# Patient Record
Sex: MALE | Race: WHITE | NOT HISPANIC OR LATINO
[De-identification: names, ages, dates, MRNs, and addresses within clinical notes are randomized per-mention and may not be internally consistent; named-entity substitution may affect disease eponyms.]

---

## 2019-01-04 PROBLEM — Z00.00 ENCOUNTER FOR PREVENTIVE HEALTH EXAMINATION: Status: ACTIVE | Noted: 2019-01-04

## 2019-01-10 ENCOUNTER — APPOINTMENT (OUTPATIENT)
Dept: HEMATOLOGY ONCOLOGY | Facility: CLINIC | Age: 79
End: 2019-01-10
Payer: MEDICARE

## 2019-01-10 VITALS
OXYGEN SATURATION: 97 % | BODY MASS INDEX: 28.62 KG/M2 | SYSTOLIC BLOOD PRESSURE: 110 MMHG | DIASTOLIC BLOOD PRESSURE: 68 MMHG | TEMPERATURE: 97.6 F | HEART RATE: 90 BPM | HEIGHT: 74 IN | WEIGHT: 223 LBS | RESPIRATION RATE: 18 BRPM

## 2019-01-10 DIAGNOSIS — Z77.090 CONTACT WITH AND (SUSPECTED) EXPOSURE TO ASBESTOS: ICD-10-CM

## 2019-01-10 DIAGNOSIS — Z86.79 PERSONAL HISTORY OF OTHER DISEASES OF THE CIRCULATORY SYSTEM: ICD-10-CM

## 2019-01-10 DIAGNOSIS — Z87.19 PERSONAL HISTORY OF OTHER DISEASES OF THE DIGESTIVE SYSTEM: ICD-10-CM

## 2019-01-10 DIAGNOSIS — Z86.39 PERSONAL HISTORY OF OTHER ENDOCRINE, NUTRITIONAL AND METABOLIC DISEASE: ICD-10-CM

## 2019-01-10 DIAGNOSIS — Z78.9 OTHER SPECIFIED HEALTH STATUS: ICD-10-CM

## 2019-01-10 DIAGNOSIS — Z86.2 PERSONAL HISTORY OF DISEASES OF THE BLOOD AND BLOOD-FORMING ORGANS AND CERTAIN DISORDERS INVOLVING THE IMMUNE MECHANISM: ICD-10-CM

## 2019-01-10 PROCEDURE — 99205 OFFICE O/P NEW HI 60 MIN: CPT

## 2019-01-10 RX ORDER — UBIDECARENONE/VIT E ACET 100MG-5
1000 CAPSULE ORAL
Refills: 0 | Status: ACTIVE | COMMUNITY

## 2019-01-10 RX ORDER — LOVASTATIN 40 MG/1
40 TABLET ORAL DAILY
Refills: 0 | Status: ACTIVE | COMMUNITY

## 2019-01-10 RX ORDER — BICALUTAMIDE 50 MG/1
50 TABLET ORAL
Refills: 0 | Status: ACTIVE | COMMUNITY

## 2019-01-10 RX ORDER — ASPIRIN 81 MG
81 TABLET, DELAYED RELEASE (ENTERIC COATED) ORAL
Refills: 0 | Status: ACTIVE | COMMUNITY

## 2019-01-10 RX ORDER — TAMSULOSIN HYDROCHLORIDE 0.4 MG/1
0.4 CAPSULE ORAL
Refills: 0 | Status: COMPLETED | COMMUNITY
End: 2019-01-01

## 2019-01-12 PROBLEM — Z86.39 HISTORY OF VITAMIN D DEFICIENCY: Status: RESOLVED | Noted: 2019-01-10 | Resolved: 2019-01-12

## 2019-01-12 PROBLEM — Z77.090 LONG TERM EXPOSURE TO ASBESTOS: Status: ACTIVE | Noted: 2019-01-10

## 2019-01-12 PROBLEM — Z86.39 HISTORY OF HYPERLIPIDEMIA: Status: RESOLVED | Noted: 2019-01-10 | Resolved: 2019-01-12

## 2019-01-12 PROBLEM — Z86.2 HISTORY OF ANEMIA: Status: RESOLVED | Noted: 2019-01-10 | Resolved: 2019-01-12

## 2019-01-12 PROBLEM — Z86.79 HISTORY OF HYPERTENSION: Status: RESOLVED | Noted: 2019-01-10 | Resolved: 2019-01-12

## 2019-01-12 PROBLEM — Z87.19 HISTORY OF DIVERTICULOSIS: Status: RESOLVED | Noted: 2019-01-10 | Resolved: 2019-01-12

## 2019-01-12 PROBLEM — Z86.79 HISTORY OF AORTIC VALVE DISORDER: Status: RESOLVED | Noted: 2019-01-10 | Resolved: 2019-01-12

## 2019-01-12 PROBLEM — Z78.9 CAFFEINE USE: Status: ACTIVE | Noted: 2019-01-10

## 2019-01-12 NOTE — HISTORY OF PRESENT ILLNESS
[de-identified] : This is a very pleasant 78-year-old gentleman with the below past medical history whose oncological history dates back to December of 2015. He had been receiving his care in [facility] up to this point.   He underwent a prostatic biopsy for a rising PSA, he believes it was 4.1ng/ml at that time, and was found to have a [place] score 9 adenocarcinoma. He underwent a CAT scan of the abdomen and pelvis on January 12, 2016 as well as a bone scan on that same day both of which were negative for metastatic disease. He also underwent a DEXA bone density scan which revealed osteopenia. He was therefore initiated on androgen deprivation therapy in late January of 2016 and underwent IMRT.  It was recommended to him to stay on Lupron for 2-3 years thereafter. More recently it was noted that his PSA started increasing again. As an example, his PSA on 4/5/2017 was 0.24; on 8/8/2017 and was 0.34; on 4/26/2018 was 1.1; on 7/24/2018 it was 2.4 ng/ml. He believes that approximately in August of 2018 bicalutamide was added to his treatment regimen.  His course was complicated with urinary retention after the radiation therapy. He has recently moved to the local area to be closer to family and is wishing to transfer his care here.  He has been under the care of Dr. Waters since admission at [facility] for urinary tract infection in September of 2018.  His most recent PSA obtained by Dr. Waters was more than 28 ng/ml.  He is now referred for further treatment and evaluation. [de-identified] : Mr Clarke is here today for an initial consultation in the company of his daughter, Hattie. He has been referred by Dr. Waters, urology for evaluation.

## 2019-01-12 NOTE — ASSESSMENT
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 He has undergone androgen deprivation therapy and IMRT and who appears to have a rapidly rising PSA despite the addition of bicalutamide in August of 2018. He is also had a bone scan on 12/26/18 that shows suspicious uptake in the manubrium, right anterior second rib, and at the superior lateral aspect of the left acetabulum.  Given these findings I would obtain complete staging scans to include a CAT scan of the chest, abdomen, and pelvis. This may further assess the bone scan findings, evaluate the parenchyma, and further evaluate the collecting system given the abnormalities seen on x-rays in September of 2018.  I will also obtain testosterone levels to see if he is adequately suppressed.\par Further recommendations will depend on the above findings. If he does have bony metastatic disease than his Prolia every 6 months should be converted to Xgeva every 4 weeks.\par I will obtain a repeat CBC with differential and a peripheral blood smear review as he appears to have become somewhat severely anemic on his most recent blood work. I will also obtain a complete hematological workup for this at today's office visit. I have requested more detailed records from his prior treating oncologist in [place]. He will return in 2 weeks for followup of the above and further treatment recommendations.\par \par Thank you very much for allowing me to participate in this gentleman's medical care, should you have any questions or concerns please do not hesitate to call me directly.

## 2019-01-12 NOTE — REASON FOR VISIT
[Initial Consultation] : an initial consultation [Spouse] : spouse [FreeTextEntry2] : prostate cancer.

## 2019-01-12 NOTE — REVIEW OF SYSTEMS
[Fatigue] : fatigue [SOB on Exertion] : shortness of breath during exertion [Abdominal Pain] : abdominal pain [Negative] : Psychiatric [FreeTextEntry7] : occasional [FreeTextEntry8] : Currently uses indwelling catheter 24 hours a day.

## 2019-01-24 ENCOUNTER — APPOINTMENT (OUTPATIENT)
Dept: HEMATOLOGY ONCOLOGY | Facility: CLINIC | Age: 79
End: 2019-01-24
Payer: MEDICARE

## 2019-01-28 ENCOUNTER — APPOINTMENT (OUTPATIENT)
Dept: HEMATOLOGY ONCOLOGY | Facility: CLINIC | Age: 79
End: 2019-01-28
Payer: MEDICARE

## 2019-01-28 VITALS
OXYGEN SATURATION: 99 % | HEIGHT: 74 IN | BODY MASS INDEX: 27.72 KG/M2 | WEIGHT: 216 LBS | TEMPERATURE: 97.4 F | RESPIRATION RATE: 20 BRPM | DIASTOLIC BLOOD PRESSURE: 69 MMHG | SYSTOLIC BLOOD PRESSURE: 113 MMHG | HEART RATE: 90 BPM

## 2019-01-28 PROCEDURE — 99214 OFFICE O/P EST MOD 30 MIN: CPT

## 2019-01-28 RX ORDER — AMLODIPINE BESYLATE 10 MG/1
10 TABLET ORAL
Refills: 0 | Status: DISCONTINUED | COMMUNITY
End: 2019-01-28

## 2019-01-29 NOTE — ASSESSMENT
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 He has undergone androgen deprivation therapy and IMRT and who appears to have a rapidly rising PSA despite the addition of bicalutamide in August of 2018. He is also had a bone scan on 12/26/18 that shows suspicious uptake in the manubrium, right anterior second rib, and at the superior lateral aspect of the left acetabulum.  These findings were confirmed on his recent CAT scan of the chest, abdomen, and pelvis. Also found were bilateral pulmonary nodules as well as possible right hilar lymphadenopathy. This was obscured due to lack of intravenous contrast. I will therefore order a PET/CT to better assess this as this may have an impact on her treatment options may be available to him.\par Testosterone levels appear to be below the threshold for castration level.\par Given the bony metastatic disease I would convert his Prolia every 6 months to Xgeva every 4 weeks.\par At this time he denies musculoskeletal pain.\par I have also asked him to followup with his urologist given the hydronephrosis seen on this recent CAT scan and worsening creatinine.\par His anemia workup has revealed an iron deficiency and have recommended intravenous iron therapy.\par He will return in 1 week for followup of the above and further treatment recommendations. [Palliative] : Goals of care discussed with patient: Palliative [Palliative Care Plan] : not applicable at this time

## 2019-01-29 NOTE — REVIEW OF SYSTEMS
[Fatigue] : fatigue [SOB on Exertion] : shortness of breath during exertion [Abdominal Pain] : abdominal pain [Negative] : Heme/Lymph [FreeTextEntry7] : occasional [FreeTextEntry8] : Currently uses indwelling catheter 24 hours a day.

## 2019-01-29 NOTE — HISTORY OF PRESENT ILLNESS
[M: ___] : M[unfilled] [AJCC Stage: ____] : AJCC Stage: [unfilled] [de-identified] : This is a very pleasant 78-year-old gentleman with the below past medical history whose oncological history dates back to December of 2015. He had been receiving his care in [facility] up to this point.   He underwent a prostatic biopsy for a rising PSA, he believes it was 4.1ng/ml at that time, and was found to have a [place] score 9 adenocarcinoma. He underwent a CAT scan of the abdomen and pelvis on January 12, 2016 as well as a bone scan on that same day both of which were negative for metastatic disease. He also underwent a DEXA bone density scan which revealed osteopenia. He was therefore initiated on androgen deprivation therapy in late January of 2016 and underwent IMRT.  It was recommended to him to stay on Lupron for 2-3 years thereafter. More recently it was noted that his PSA started increasing again. As an example, his PSA on 4/5/2017 was 0.24; on 8/8/2017 and was 0.34; on 4/26/2018 was 1.1; on 7/24/2018 it was 2.4 ng/ml. He believes that approximately in August of 2018 bicalutamide was added to his treatment regimen.  His course was complicated with urinary retention after the radiation therapy. He has recently moved to the local area to be closer to family and is wishing to transfer his care here.  He has been under the care of Dr. Waters since admission at [facility] for urinary tract infection in September of 2018.  His most recent PSA obtained by Dr. Waters was more than 28 ng/ml.  He is now referred for further treatment and evaluation.  He is here for f/u to review w/u including scans. [de-identified] : Mr Clarke is here today for a follow up visit with his daughter Hattie. He offers no new complaints today.

## 2019-01-29 NOTE — REASON FOR VISIT
[Initial Consultation] : an initial consultation [Spouse] : spouse [Follow-Up Visit] : a follow-up [FreeTextEntry2] : prostate cancer.

## 2019-02-04 ENCOUNTER — APPOINTMENT (OUTPATIENT)
Dept: HEMATOLOGY ONCOLOGY | Facility: CLINIC | Age: 79
End: 2019-02-04
Payer: MEDICARE

## 2019-02-05 NOTE — HISTORY OF PRESENT ILLNESS
[M: ___] : M[unfilled] [AJCC Stage: ____] : AJCC Stage: [unfilled] [de-identified] : This is a very pleasant 78-year-old gentleman with the below past medical history whose oncological history dates back to December of 2015. He had been receiving his care in [facility] up to this point.   He underwent a prostatic biopsy for a rising PSA, he believes it was 4.1ng/ml at that time, and was found to have a [place] score 9 adenocarcinoma. He underwent a CAT scan of the abdomen and pelvis on January 12, 2016 as well as a bone scan on that same day both of which were negative for metastatic disease. He also underwent a DEXA bone density scan which revealed osteopenia. He was therefore initiated on androgen deprivation therapy in late January of 2016 and underwent IMRT.  It was recommended to him to stay on Lupron for 2-3 years thereafter. More recently it was noted that his PSA started increasing again. As an example, his PSA on 4/5/2017 was 0.24; on 8/8/2017 and was 0.34; on 4/26/2018 was 1.1; on 7/24/2018 it was 2.4 ng/ml. He believes that approximately in August of 2018 bicalutamide was added to his treatment regimen.  His course was complicated with urinary retention after the radiation therapy. He has recently moved to the local area to be closer to family and is wishing to transfer his care here.  He has been under the care of Dr. Waters since admission at [facility] for urinary tract infection in September of 2018.  His most recent PSA obtained by Dr. Waters was more than 28 ng/ml.  He is now referred for further treatment and evaluation.  He is here for f/u to review w/u including scans. [de-identified] : Mr Clarke is here today for a follow up visit with his daughter Hattie. He offers no new complaints today.

## 2019-02-05 NOTE — REASON FOR VISIT
[Follow-Up Visit] : a follow-up [Initial Consultation] : an initial consultation [Spouse] : spouse [FreeTextEntry2] : prostate cancer.

## 2019-02-08 ENCOUNTER — APPOINTMENT (OUTPATIENT)
Dept: HEMATOLOGY ONCOLOGY | Facility: CLINIC | Age: 79
End: 2019-02-08
Payer: MEDICARE

## 2019-02-08 VITALS
WEIGHT: 213 LBS | TEMPERATURE: 97.9 F | BODY MASS INDEX: 27.34 KG/M2 | HEIGHT: 74 IN | SYSTOLIC BLOOD PRESSURE: 109 MMHG | OXYGEN SATURATION: 99 % | HEART RATE: 93 BPM | RESPIRATION RATE: 18 BRPM | DIASTOLIC BLOOD PRESSURE: 62 MMHG

## 2019-02-08 PROCEDURE — 99214 OFFICE O/P EST MOD 30 MIN: CPT

## 2019-02-08 RX ORDER — ENZALUTAMIDE 40 MG/1
40 CAPSULE ORAL DAILY
Qty: 120 | Refills: 2 | Status: ACTIVE | COMMUNITY
Start: 2019-02-08 | End: 1900-01-01

## 2019-02-10 NOTE — HISTORY OF PRESENT ILLNESS
[M: ___] : M[unfilled] [AJCC Stage: ____] : AJCC Stage: [unfilled] [de-identified] : This is a very pleasant 78-year-old gentleman with the below past medical history whose oncological history dates back to December of 2015. He had been receiving his care in [facility] up to this point.   He underwent a prostatic biopsy for a rising PSA, he believes it was 4.1ng/ml at that time, and was found to have a [place] score 9 adenocarcinoma. He underwent a CAT scan of the abdomen and pelvis on January 12, 2016 as well as a bone scan on that same day both of which were negative for metastatic disease. He also underwent a DEXA bone density scan which revealed osteopenia. He was therefore initiated on androgen deprivation therapy in late January of 2016 and underwent IMRT.  It was recommended to him to stay on Lupron for 2-3 years thereafter. More recently it was noted that his PSA started increasing again. As an example, his PSA on 4/5/2017 was 0.24; on 8/8/2017 and was 0.34; on 4/26/2018 was 1.1; on 7/24/2018 it was 2.4 ng/ml. He believes that approximately in August of 2018 bicalutamide was added to his treatment regimen.  His course was complicated with urinary retention after the radiation therapy. He has recently moved to the local area to be closer to family and is wishing to transfer his care here.  He has been under the care of Dr. Waters since admission at [facility] for urinary tract infection in September of 2018.  His most recent PSA obtained by Dr. Waters was more than 28 ng/ml.  He is now referred for further treatment and evaluation.  He is here for f/u to review w/u including scans. [de-identified] : Mr Clarke is here today for a follow up visit with his daughter Hattie and with his son. He offers no new complaints today. [0 - No Distress] : Distress Level: 0

## 2019-02-10 NOTE — ASSESSMENT
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 He has undergone androgen deprivation therapy and IMRT and who appears to have a rapidly rising PSA despite the addition of bicalutamide in August of 2018. He is also had a bone scan on 12/26/18 that shows suspicious uptake in the manubrium, right anterior second rib, and at the superior lateral aspect of the left acetabulum.  These findings were confirmed on his recent CAT scan of the chest, abdomen, and pelvis. Also found were bilateral pulmonary nodules as well as possible right hilar lymphadenopathy. This was obscured due to lack of intravenous contrast. He had a PET/CT on 2/7/19 which unfortunately confirmed the hypermetabolic nature of multiple thoracic lymph nodes most prominent in the right hilum, hypermetabolic left retrocrural lymph node, multiple hypermetabolic pulmonary nodules, as well as multiple hypermetabolic osseous foci.  This included a T4 and L5 vertebra, right iliac bone, left iliac bone, left anterior acetabulum, sternum, and right second rib.\par Testosterone levels appear to be below the threshold for castration level.\par Given the bony metastatic disease I will convert his Prolia every 6 months to Xgeva every 4 weeks.\par At this time he denies musculoskeletal pain.  He knows to obtain a dental clearance for this.\par I have also asked him to followup with his urologist given the hydronephrosis seen on this recent CAT scan and worsening creatinine.  At today's office visit his creatinine remained stable. He has scheduled an appointment with his urologist for this coming Monday.\par Given these above findings I have recommended that he start on Xtandi therapy. I discussed the benefits/risks/side effects both, his daughter and son, they wish to proceed forward with therapy.\par This has been Escripted and is pending insurance authorization.\par His anemia workup has revealed an iron deficiency and he has started intravenous iron therapy.\par He will return in 3 week for followup of the above and further treatment recommendations.

## 2019-03-04 ENCOUNTER — APPOINTMENT (OUTPATIENT)
Dept: HEMATOLOGY ONCOLOGY | Facility: CLINIC | Age: 79
End: 2019-03-04
Payer: MEDICARE

## 2019-03-04 VITALS
BODY MASS INDEX: 26.69 KG/M2 | WEIGHT: 208 LBS | TEMPERATURE: 97.3 F | OXYGEN SATURATION: 99 % | HEART RATE: 100 BPM | RESPIRATION RATE: 18 BRPM | DIASTOLIC BLOOD PRESSURE: 64 MMHG | HEIGHT: 74 IN | SYSTOLIC BLOOD PRESSURE: 124 MMHG

## 2019-03-04 PROCEDURE — 99214 OFFICE O/P EST MOD 30 MIN: CPT

## 2019-03-04 RX ORDER — DILTIAZEM HYDROCHLORIDE 90 MG/1
TABLET ORAL DAILY
Refills: 0 | Status: ACTIVE | COMMUNITY

## 2019-03-04 NOTE — HISTORY OF PRESENT ILLNESS
[M: ___] : M[unfilled] [AJCC Stage: ____] : AJCC Stage: [unfilled] [0 - No Distress] : Distress Level: 0 [de-identified] : This is a very pleasant 78-year-old gentleman with the below past medical history whose oncological history dates back to December of 2015. He had been receiving his care in [facility] up to this point.   He underwent a prostatic biopsy for a rising PSA, he believes it was 4.1ng/ml at that time, and was found to have a [place] score 9 adenocarcinoma. He underwent a CAT scan of the abdomen and pelvis on January 12, 2016 as well as a bone scan on that same day both of which were negative for metastatic disease. He also underwent a DEXA bone density scan which revealed osteopenia. He was therefore initiated on androgen deprivation therapy in late January of 2016 and underwent IMRT.  It was recommended to him to stay on Lupron for 2-3 years thereafter. More recently it was noted that his PSA started increasing again. As an example, his PSA on 4/5/2017 was 0.24; on 8/8/2017 and was 0.34; on 4/26/2018 was 1.1; on 7/24/2018 it was 2.4 ng/ml. He believes that approximately in August of 2018 bicalutamide was added to his treatment regimen.  His course was complicated with urinary retention after the radiation therapy. He has recently moved to the local area to be closer to family and is wishing to transfer his care here.  He has been under the care of Dr. Waters since admission at [facility] for urinary tract infection in September of 2018.  His most recent PSA obtained by Dr. Waters was more than 28 ng/ml.  He is now referred for further treatment and evaluation.  He is here for f/u to review w/u including scans. [de-identified] : Mr Clarke is here today for a follow up visit with his daughter Hattie. He complains of extreme fatigue. Since his last visit during infusion, he was admitted to the hospital and treated for afib. He was discharged on eliquis and diltiazem. Following one week on eliquis he developed hematuria, and subsequently discontinued the elliquis.

## 2019-03-04 NOTE — PHYSICAL EXAM
[Restricted in physically strenuous activity but ambulatory and able to carry out work of a light or sedentary nature] : Status 1- Restricted in physically strenuous activity but ambulatory and able to carry out work of a light or sedentary nature, e.g., light house work, office work [Normal] : affect appropriate [de-identified] : chronically ill appearing.

## 2019-03-04 NOTE — REVIEW OF SYSTEMS
[Fatigue] : fatigue [SOB on Exertion] : shortness of breath during exertion [Abdominal Pain] : abdominal pain [Negative] : Heme/Lymph [Vision Problems] : vision problems [FreeTextEntry3] : glasses [FreeTextEntry7] : occasional [FreeTextEntry8] : Currently uses indwelling catheter 24 hours a day.

## 2019-03-04 NOTE — REASON FOR VISIT
[Follow-Up Visit] : a follow-up [Initial Consultation] : an initial consultation [Spouse] : spouse [Family Member] : family member [FreeTextEntry2] : prostate cancer.

## 2019-03-08 ENCOUNTER — OTHER (OUTPATIENT)
Age: 79
End: 2019-03-08

## 2019-03-11 ENCOUNTER — OTHER (OUTPATIENT)
Age: 79
End: 2019-03-11

## 2019-03-18 ENCOUNTER — APPOINTMENT (OUTPATIENT)
Dept: HEMATOLOGY ONCOLOGY | Facility: CLINIC | Age: 79
End: 2019-03-18
Payer: MEDICARE

## 2019-03-18 VITALS
WEIGHT: 203 LBS | SYSTOLIC BLOOD PRESSURE: 133 MMHG | HEIGHT: 74 IN | DIASTOLIC BLOOD PRESSURE: 70 MMHG | BODY MASS INDEX: 26.05 KG/M2 | OXYGEN SATURATION: 98 % | TEMPERATURE: 97.6 F | RESPIRATION RATE: 18 BRPM | HEART RATE: 85 BPM

## 2019-03-18 PROCEDURE — 99214 OFFICE O/P EST MOD 30 MIN: CPT

## 2019-03-18 NOTE — ASSESSMENT
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 He has undergone androgen deprivation therapy and IMRT and who appears to have a rapidly rising PSA despite the addition of bicalutamide in August of 2018. He is also had a bone scan on 12/26/18 that shows suspicious uptake in the manubrium, right anterior second rib, and at the superior lateral aspect of the left acetabulum.  These findings were confirmed on his recent CAT scan of the chest, abdomen, and pelvis. Also found were bilateral pulmonary nodules as well as possible right hilar lymphadenopathy. This was obscured due to lack of intravenous contrast. He had a PET/CT on 2/7/19 which unfortunately confirmed the hypermetabolic nature of multiple thoracic lymph nodes most prominent in the right hilum, hypermetabolic left retrocrural lymph node, multiple hypermetabolic pulmonary nodules, as well as multiple hypermetabolic osseous foci.  This included a T4 and L5 vertebra, right iliac bone, left iliac bone, left anterior acetabulum, sternum, and right second rib.\par Testosterone levels appear to be below the threshold for castration level.\par Given the bony metastatic disease I will convert his Prolia every 6 months to Xgeva every 4 weeks.\par He was able to obtain dental clearance for this.\par I will make arrangements for him to be able to receive Xgeva at his next visit.\par At this time he denies musculoskeletal pain.\par I have also asked him to followup with his urologist given the hydronephrosis seen on this recent CAT scan and worsening creatinine.  Creatinine is pending today.\par Given these above findings I have recommended that he start on Xtandi therapy. I discussed the benefits/risks/side effects with him and both with his daughter and son, they wish to proceed forward with therapy.  He started on 3/16/19.\par His anemia workup has revealed an iron deficiency and he has started intravenous iron therapy.\par He will return in 2 weeks for followup of the above and further treatment recommendations.

## 2019-03-18 NOTE — PHYSICAL EXAM
[Restricted in physically strenuous activity but ambulatory and able to carry out work of a light or sedentary nature] : Status 1- Restricted in physically strenuous activity but ambulatory and able to carry out work of a light or sedentary nature, e.g., light house work, office work [Normal] : affect appropriate [de-identified] : chronically ill appearing.

## 2019-03-18 NOTE — REVIEW OF SYSTEMS
[Fatigue] : fatigue [Vision Problems] : vision problems [SOB on Exertion] : shortness of breath during exertion [Abdominal Pain] : abdominal pain [Negative] : Heme/Lymph [FreeTextEntry3] : glasses [FreeTextEntry8] : Currently uses indwelling catheter 24 hours a day. [FreeTextEntry7] : occasional

## 2019-03-18 NOTE — HISTORY OF PRESENT ILLNESS
[M: ___] : M[unfilled] [AJCC Stage: ____] : AJCC Stage: [unfilled] [0 - No Distress] : Distress Level: 0 [de-identified] : This is a very pleasant 78-year-old gentleman with the below past medical history whose oncological history dates back to December of 2015. He had been receiving his care in [facility] up to this point.   He underwent a prostatic biopsy for a rising PSA, he believes it was 4.1ng/ml at that time, and was found to have a [place] score 9 adenocarcinoma. He underwent a CAT scan of the abdomen and pelvis on January 12, 2016 as well as a bone scan on that same day both of which were negative for metastatic disease. He also underwent a DEXA bone density scan which revealed osteopenia. He was therefore initiated on androgen deprivation therapy in late January of 2016 and underwent IMRT.  It was recommended to him to stay on Lupron for 2-3 years thereafter. More recently it was noted that his PSA started increasing again. As an example, his PSA on 4/5/2017 was 0.24; on 8/8/2017 and was 0.34; on 4/26/2018 was 1.1; on 7/24/2018 it was 2.4 ng/ml. He believes that approximately in August of 2018 bicalutamide was added to his treatment regimen.  His course was complicated with urinary retention after the radiation therapy. He has recently moved to the local area to be closer to family and is wishing to transfer his care here.  He has been under the care of Dr. Waters since admission at [facility] for urinary tract infection in September of 2018.  His most recent PSA obtained by Dr. Waters was more than 28 ng/ml.  He is now referred for further treatment and evaluation.  He is here for f/u to review w/u including scans.  Started on Xtandi on 3/16/19. [de-identified] : Mr Clarke is here today for a follow up visit with his daughter Hattie. He complains of extreme fatigue. Since his last visit during infusion, he was admitted to the hospital and treated for afib. He was discharged on eliquis and diltiazem. Has finally received a delivery of Xtandi and was able to started on 3/16/19. Has no new complaints the time of his office evaluation.

## 2019-04-02 ENCOUNTER — APPOINTMENT (OUTPATIENT)
Dept: HEMATOLOGY ONCOLOGY | Facility: CLINIC | Age: 79
End: 2019-04-02
Payer: MEDICARE

## 2019-04-02 VITALS
RESPIRATION RATE: 18 BRPM | SYSTOLIC BLOOD PRESSURE: 135 MMHG | HEART RATE: 97 BPM | BODY MASS INDEX: 25.36 KG/M2 | OXYGEN SATURATION: 99 % | WEIGHT: 204 LBS | DIASTOLIC BLOOD PRESSURE: 67 MMHG | TEMPERATURE: 97.4 F | HEIGHT: 75 IN

## 2019-04-02 PROCEDURE — 99214 OFFICE O/P EST MOD 30 MIN: CPT

## 2019-04-02 NOTE — REVIEW OF SYSTEMS
[Fatigue] : fatigue [Vision Problems] : vision problems [SOB on Exertion] : shortness of breath during exertion [Negative] : Allergic/Immunologic [Abdominal Pain] : no abdominal pain [FreeTextEntry3] : glasses [FreeTextEntry8] : Currently uses indwelling catheter 24 hours a day.

## 2019-04-02 NOTE — ASSESSMENT
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 He has undergone androgen deprivation therapy and IMRT and who appears to have a rapidly rising PSA despite the addition of bicalutamide in August of 2018. He is also had a bone scan on 12/26/18 that shows suspicious uptake in the manubrium, right anterior second rib, and at the superior lateral aspect of the left acetabulum.  These findings were confirmed on his recent CAT scan of the chest, abdomen, and pelvis. Also found were bilateral pulmonary nodules as well as possible right hilar lymphadenopathy. This was obscured due to lack of intravenous contrast. He had a PET/CT on 2/7/19 which unfortunately confirmed the hypermetabolic nature of multiple thoracic lymph nodes most prominent in the right hilum, hypermetabolic left retrocrural lymph node, multiple hypermetabolic pulmonary nodules, as well as multiple hypermetabolic osseous foci.  This included a T4 and L5 vertebra, right iliac bone, left iliac bone, left anterior acetabulum, sternum, and right second rib.\par Testosterone levels appear to be below the threshold for castration level.\par Given the bony metastatic disease I will convert his Prolia every 6 months to Xgeva every 4 weeks.\par He was able to obtain dental clearance for this.\par He will receive Xgeva at this visit.\par At this time he denies musculoskeletal pain.\par Given these above findings I had recommended that he start on Xtandi therapy. I discussed the benefits/risks/side effects with him and both with his daughter and son, they wish to proceed forward with therapy.  He started on 3/16/19.\par His anemia workup has revealed an iron deficiency and he has completed intravenous iron therapy.  Iron studies will be repeated today.  If his iron stores improve and he continues to remain severely anemic then a bone marrow exam will be needed to r/o a marrow infiltrative process vs. a hypoproliferative bone marrow state as these may play a role in the treatment of his prostate cancer moving forward. \par He will return in 2 weeks for followup of the above and further treatment recommendations.

## 2019-04-02 NOTE — HISTORY OF PRESENT ILLNESS
[M: ___] : M[unfilled] [AJCC Stage: ____] : AJCC Stage: [unfilled] [0 - No Distress] : Distress Level: 0 [de-identified] : This is a very pleasant 79-year-old gentleman with the below past medical history whose oncological history dates back to December of 2015. He had been receiving his care in [facility] up to this point.   He underwent a prostatic biopsy for a rising PSA, he believes it was 4.1ng/ml at that time, and was found to have a [place] score 9 adenocarcinoma. He underwent a CAT scan of the abdomen and pelvis on January 12, 2016 as well as a bone scan on that same day both of which were negative for metastatic disease. He also underwent a DEXA bone density scan which revealed osteopenia. He was therefore initiated on androgen deprivation therapy in late January of 2016 and underwent IMRT.  It was recommended to him to stay on Lupron for 2-3 years thereafter. More recently it was noted that his PSA started increasing again. As an example, his PSA on 4/5/2017 was 0.24; on 8/8/2017 and was 0.34; on 4/26/2018 was 1.1; on 7/24/2018 it was 2.4 ng/ml. He believes that approximately in August of 2018 bicalutamide was added to his treatment regimen.  His course was complicated with urinary retention after the radiation therapy. He has recently moved to the local area to be closer to family and wished to transfer his care here.  He has been under the care of Dr. Waters since admission at [facility] for urinary tract infection in September of 2018.  His most recent PSA obtained by Dr. Waters was more than 28 ng/ml.  He is here for f/u and labs.  Started on Xtandi on 3/16/19.  PSA peaked at around 46 at the time Xtandi was started. [de-identified] : Mr Calrke is here today for a follow up visit with his daughter Hattie. He continues to report fatigue. Per pt, he is tolerating the Xtandi well and has completed 8 iron infusions.

## 2019-04-02 NOTE — PHYSICAL EXAM
[Restricted in physically strenuous activity but ambulatory and able to carry out work of a light or sedentary nature] : Status 1- Restricted in physically strenuous activity but ambulatory and able to carry out work of a light or sedentary nature, e.g., light house work, office work [Normal] : affect appropriate [de-identified] : chronically ill appearing.

## 2019-04-22 ENCOUNTER — LABORATORY RESULT (OUTPATIENT)
Age: 79
End: 2019-04-22

## 2019-04-30 ENCOUNTER — APPOINTMENT (OUTPATIENT)
Dept: HEMATOLOGY ONCOLOGY | Facility: CLINIC | Age: 79
End: 2019-04-30
Payer: MEDICARE

## 2019-04-30 VITALS
SYSTOLIC BLOOD PRESSURE: 122 MMHG | RESPIRATION RATE: 18 BRPM | TEMPERATURE: 97.8 F | OXYGEN SATURATION: 100 % | BODY MASS INDEX: 24.37 KG/M2 | HEART RATE: 108 BPM | WEIGHT: 196 LBS | HEIGHT: 75 IN | DIASTOLIC BLOOD PRESSURE: 63 MMHG

## 2019-04-30 PROCEDURE — 99214 OFFICE O/P EST MOD 30 MIN: CPT

## 2019-04-30 NOTE — ASSESSMENT
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 He has undergone androgen deprivation therapy and IMRT and who appears to have a rapidly rising PSA despite the addition of bicalutamide in August of 2018. He is also had a bone scan on 12/26/18 that shows suspicious uptake in the manubrium, right anterior second rib, and at the superior lateral aspect of the left acetabulum.  These findings were confirmed on his recent CAT scan of the chest, abdomen, and pelvis. Also found were bilateral pulmonary nodules as well as possible right hilar lymphadenopathy. This was obscured due to lack of intravenous contrast. He had a PET/CT on 2/7/19 which unfortunately confirmed the hypermetabolic nature of multiple thoracic lymph nodes most prominent in the right hilum, hypermetabolic left retrocrural lymph node, multiple hypermetabolic pulmonary nodules, as well as multiple hypermetabolic osseous foci.  This included a T4 and L5 vertebra, right iliac bone, left iliac bone, left anterior acetabulum, sternum, and right second rib.\par Testosterone levels appear to be below the threshold for castration level.\par Given the bony metastatic disease I converted his Prolia every 6 months to Xgeva every 4 weeks.\par He was able to obtain dental clearance for this.\par He will receive Xgeva at this visit.\par At this time he denies musculoskeletal pain.\par Given these above findings I had recommended that he start on Xtandi therapy. I discussed the benefits/risks/side effects with him and both with his daughter and son, they wish to proceed forward with therapy.  He started on 3/16/19.\par His anemia workup has revealed an iron deficiency and he has completed intravenous iron therapy.  Iron studies will be repeated today.  If his iron stores improve and he continues to remain severely anemic then a bone marrow exam will be needed to r/o a marrow infiltrative process vs. a hypoproliferative bone marrow state as these may play a role in the treatment of his prostate cancer moving forward. \par He will return in 2 weeks for followup of the above and further treatment recommendations.

## 2019-04-30 NOTE — HISTORY OF PRESENT ILLNESS
[M: ___] : M[unfilled] [AJCC Stage: ____] : AJCC Stage: [unfilled] [0 - No Distress] : Distress Level: 0 [de-identified] : This is a very pleasant 79-year-old gentleman with the below past medical history whose oncological history dates back to December of 2015. He had been receiving his care in [facility] up to this point.   He underwent a prostatic biopsy for a rising PSA, he believes it was 4.1ng/ml at that time, and was found to have a [place] score 9 adenocarcinoma. He underwent a CAT scan of the abdomen and pelvis on January 12, 2016 as well as a bone scan on that same day both of which were negative for metastatic disease. He also underwent a DEXA bone density scan which revealed osteopenia. He was therefore initiated on androgen deprivation therapy in late January of 2016 and underwent IMRT.  It was recommended to him to stay on Lupron for 2-3 years thereafter. More recently it was noted that his PSA started increasing again. As an example, his PSA on 4/5/2017 was 0.24; on 8/8/2017 and was 0.34; on 4/26/2018 was 1.1; on 7/24/2018 it was 2.4 ng/ml. He believes that approximately in August of 2018 bicalutamide was added to his treatment regimen.  His course was complicated with urinary retention after the radiation therapy. He has recently moved to the local area to be closer to family and wished to transfer his care here.  He has been under the care of Dr. Waters since admission at [facility] for urinary tract infection in September of 2018.  His most recent PSA obtained by Dr. Waters was more than 28 ng/ml.  He is here for f/u and labs.  Started on Xtandi on 3/16/19.  PSA peaked at around 46 at the time Xtandi was started. [de-identified] : Mr Clarke is here today for a follow up visit with his daughter Hattie. He continues to report severe fatigue. Per pt, he is tolerating the Xtandi well.

## 2019-04-30 NOTE — REVIEW OF SYSTEMS
[Fatigue] : fatigue [Vision Problems] : vision problems [Abdominal Pain] : no abdominal pain [Joint Pain] : no joint pain [Negative] : Respiratory [FreeTextEntry2] : severe fatigue. [FreeTextEntry3] : glasses [FreeTextEntry8] : Currently uses indwelling catheter 24 hours a day.

## 2019-04-30 NOTE — PHYSICAL EXAM
[Restricted in physically strenuous activity but ambulatory and able to carry out work of a light or sedentary nature] : Status 1- Restricted in physically strenuous activity but ambulatory and able to carry out work of a light or sedentary nature, e.g., light house work, office work [Normal] : affect appropriate [de-identified] : chronically ill appearing.

## 2019-05-14 ENCOUNTER — APPOINTMENT (OUTPATIENT)
Dept: HEMATOLOGY ONCOLOGY | Facility: CLINIC | Age: 79
End: 2019-05-14
Payer: MEDICARE

## 2019-05-14 VITALS
WEIGHT: 189 LBS | HEART RATE: 110 BPM | RESPIRATION RATE: 18 BRPM | BODY MASS INDEX: 23.5 KG/M2 | HEIGHT: 75 IN | DIASTOLIC BLOOD PRESSURE: 75 MMHG | TEMPERATURE: 97.8 F | SYSTOLIC BLOOD PRESSURE: 122 MMHG | OXYGEN SATURATION: 96 %

## 2019-05-14 PROCEDURE — 99214 OFFICE O/P EST MOD 30 MIN: CPT

## 2019-05-14 RX ORDER — MIRTAZAPINE 15 MG/1
15 TABLET, FILM COATED ORAL
Refills: 0 | Status: ACTIVE | COMMUNITY

## 2019-05-14 NOTE — ASSESSMENT
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 He has undergone androgen deprivation therapy and IMRT and who appears to have a rapidly rising PSA despite the addition of bicalutamide in August of 2018. He is also had a bone scan on 12/26/18 that shows suspicious uptake in the manubrium, right anterior second rib, and at the superior lateral aspect of the left acetabulum.  These findings were confirmed on his recent CAT scan of the chest, abdomen, and pelvis. Also found were bilateral pulmonary nodules as well as possible right hilar lymphadenopathy. This was obscured due to lack of intravenous contrast. He had a PET/CT on 2/7/19 which unfortunately confirmed the hypermetabolic nature of multiple thoracic lymph nodes most prominent in the right hilum, hypermetabolic left retrocrural lymph node, multiple hypermetabolic pulmonary nodules, as well as multiple hypermetabolic osseous foci.  This included a T4 and L5 vertebra, right iliac bone, left iliac bone, left anterior acetabulum, sternum, and right second rib.\par Testosterone levels appear to be below the threshold for castration level.\par Given the bony metastatic disease I converted his Prolia every 6 months to Xgeva every 4 weeks.\par He was able to obtain dental clearance for this.\par He will receive Xgeva monthly.\par At this time he denies musculoskeletal pain.\par Given these above findings I had recommended that he start on Xtandi therapy. I discussed the benefits/risks/side effects with him and both with his daughter and son, they wish to proceed forward with therapy.  He started on 3/16/19.  Unfortunately his PSA levels continue to rise.  Restaging scans will be obtained and he may require initiation of systemic chemotherapy if he is truly failing Xtandi.\par His anemia workup has revealed an iron deficiency and he has completed intravenous iron therapy.  Iron studies will be repeated today.  If his iron stores improve and he continues to remain severely anemic then a bone marrow exam will be needed to r/o a marrow infiltrative process vs. a hypoproliferative bone marrow state as these may play a role in the treatment of his prostate cancer moving forward. \par He will return in 2 weeks for followup of the above and further treatment recommendations.

## 2019-05-14 NOTE — PHYSICAL EXAM
[Restricted in physically strenuous activity but ambulatory and able to carry out work of a light or sedentary nature] : Status 1- Restricted in physically strenuous activity but ambulatory and able to carry out work of a light or sedentary nature, e.g., light house work, office work [Normal] : affect appropriate [de-identified] : chronically ill appearing.

## 2019-05-14 NOTE — REVIEW OF SYSTEMS
[Fatigue] : fatigue [Vision Problems] : vision problems [Negative] : Allergic/Immunologic [Shortness Of Breath] : shortness of breath [SOB on Exertion] : shortness of breath during exertion [Abdominal Pain] : no abdominal pain [Cough] : no cough [Joint Pain] : no joint pain [Insomnia] : no insomnia [FreeTextEntry2] : fatigue. [FreeTextEntry6] : per family, breathing labored on exertion. [FreeTextEntry3] : glasses [FreeTextEntry8] : Currently uses indwelling catheter 24 hours a day.

## 2019-05-14 NOTE — HISTORY OF PRESENT ILLNESS
[M: ___] : M[unfilled] [AJCC Stage: ____] : AJCC Stage: [unfilled] [0 - No Distress] : Distress Level: 0 [de-identified] : This is a very pleasant 79-year-old gentleman with the below past medical history whose oncological history dates back to December of 2015. He had been receiving his care in [facility] up to this point.   He underwent a prostatic biopsy for a rising PSA, he believes it was 4.1ng/ml at that time, and was found to have a [place] score 9 adenocarcinoma. He underwent a CAT scan of the abdomen and pelvis on January 12, 2016 as well as a bone scan on that same day both of which were negative for metastatic disease. He also underwent a DEXA bone density scan which revealed osteopenia. He was therefore initiated on androgen deprivation therapy in late January of 2016 and underwent IMRT.  It was recommended to him to stay on Lupron for 2-3 years thereafter. More recently it was noted that his PSA started increasing again. As an example, his PSA on 4/5/2017 was 0.24; on 8/8/2017 and was 0.34; on 4/26/2018 was 1.1; on 7/24/2018 it was 2.4 ng/ml. He believes that approximately in August of 2018 bicalutamide was added to his treatment regimen.  His course was complicated with urinary retention after the radiation therapy. He has recently moved to the local area to be closer to family and wished to transfer his care here.  He has been under the care of Dr. Waters since admission at [facility] for urinary tract infection in September of 2018.  His most recent PSA obtained by Dr. Waters was more than 28 ng/ml.  He is here for f/u and labs.  Started on Xtandi on 3/16/19.  PSA peaked at around 46 at the time Xtandi was started. [de-identified] : Mr Clarke is here today for a follow up visit with his daughter Hattie. Pt reports increase in fatigue.

## 2019-05-23 ENCOUNTER — APPOINTMENT (OUTPATIENT)
Dept: HEMATOLOGY ONCOLOGY | Facility: CLINIC | Age: 79
End: 2019-05-23
Payer: MEDICARE

## 2019-05-31 ENCOUNTER — APPOINTMENT (OUTPATIENT)
Dept: HEMATOLOGY ONCOLOGY | Facility: CLINIC | Age: 79
End: 2019-05-31
Payer: MEDICARE

## 2019-05-31 VITALS
BODY MASS INDEX: 23.38 KG/M2 | WEIGHT: 188 LBS | HEIGHT: 75 IN | SYSTOLIC BLOOD PRESSURE: 110 MMHG | DIASTOLIC BLOOD PRESSURE: 70 MMHG | TEMPERATURE: 97.6 F | RESPIRATION RATE: 18 BRPM | OXYGEN SATURATION: 98 % | HEART RATE: 97 BPM

## 2019-05-31 PROCEDURE — 99214 OFFICE O/P EST MOD 30 MIN: CPT

## 2019-05-31 RX ORDER — PROCHLORPERAZINE MALEATE 10 MG/1
10 TABLET ORAL EVERY 8 HOURS
Qty: 90 | Refills: 0 | Status: ACTIVE | COMMUNITY
Start: 2019-05-31 | End: 1900-01-01

## 2019-05-31 RX ORDER — ONDANSETRON 8 MG/1
8 TABLET, ORALLY DISINTEGRATING ORAL EVERY 8 HOURS
Qty: 45 | Refills: 2 | Status: ACTIVE | COMMUNITY
Start: 2019-05-31 | End: 1900-01-01

## 2019-05-31 RX ORDER — DEXAMETHASONE 4 MG/1
4 TABLET ORAL TWICE DAILY
Qty: 120 | Refills: 0 | Status: ACTIVE | COMMUNITY
Start: 2019-05-31 | End: 1900-01-01

## 2019-06-04 ENCOUNTER — OTHER (OUTPATIENT)
Age: 79
End: 2019-06-04

## 2019-06-10 ENCOUNTER — APPOINTMENT (OUTPATIENT)
Dept: HEMATOLOGY ONCOLOGY | Facility: CLINIC | Age: 79
End: 2019-06-10
Payer: MEDICARE

## 2019-06-10 VITALS
HEART RATE: 96 BPM | HEIGHT: 75 IN | RESPIRATION RATE: 18 BRPM | BODY MASS INDEX: 23.38 KG/M2 | TEMPERATURE: 97.4 F | DIASTOLIC BLOOD PRESSURE: 69 MMHG | WEIGHT: 188 LBS | SYSTOLIC BLOOD PRESSURE: 119 MMHG | OXYGEN SATURATION: 97 %

## 2019-06-10 PROCEDURE — 99214 OFFICE O/P EST MOD 30 MIN: CPT

## 2019-06-10 NOTE — HISTORY OF PRESENT ILLNESS
[AJCC Stage: ____] : AJCC Stage: [unfilled] [M: ___] : M[unfilled] [0 - No Distress] : Distress Level: 0 [de-identified] : This is a very pleasant 79-year-old gentleman with the below past medical history whose oncological history dates back to December of 2015. He had been receiving his care in [facility] up to this point.   He underwent a prostatic biopsy for a rising PSA, he believes it was 4.1ng/ml at that time, and was found to have a [place] score 9 adenocarcinoma. He underwent a CAT scan of the abdomen and pelvis on January 12, 2016 as well as a bone scan on that same day both of which were negative for metastatic disease. He also underwent a DEXA bone density scan which revealed osteopenia. He was therefore initiated on androgen deprivation therapy in late January of 2016 and underwent IMRT.  It was recommended to him to stay on Lupron for 2-3 years thereafter. More recently it was noted that his PSA started increasing again. As an example, his PSA on 4/5/2017 was 0.24; on 8/8/2017 and was 0.34; on 4/26/2018 was 1.1; on 7/24/2018 it was 2.4 ng/ml. He believes that approximately in August of 2018 bicalutamide was added to his treatment regimen.  His course was complicated with urinary retention after the radiation therapy. He has recently moved to the local area to be closer to family and wished to transfer his care here.  He has been under the care of Dr. Waters since admission at [facility] for urinary tract infection in September of 2018.  His most recent PSA obtained by Dr. Waters was more than 28 ng/ml.  He is here for f/u and labs.  Started on Xtandi on 3/16/19.  PSA peaked at around 46 at the time Xtandi was started.  He unfortunately progress on this and was started on weekly Taxotere on 6/4/19. He is now here for office followup with labs. [de-identified] : Mr Clarke is here today for a follow up visit with his daughter Hattie. He reports extreme fatigue. First treatment well tolerated per pt, one episode of nausea relieved by zofran, now resolved.

## 2019-06-10 NOTE — REASON FOR VISIT
[Initial Consultation] : an initial consultation [Follow-Up Visit] : a follow-up [Spouse] : spouse [Family Member] : family member [FreeTextEntry2] : prostate cancer.

## 2019-06-10 NOTE — PHYSICAL EXAM
[Ambulatory and capable of all self care but unable to carry out any work activities] : Status 2- Ambulatory and capable of all self care but unable to carry out any work activities. Up and about more than 50% of waking hours [Normal] : affect appropriate [Cachectic] : cachectic [de-identified] : chronically ill appearing.

## 2019-06-10 NOTE — REVIEW OF SYSTEMS
[Fatigue] : fatigue [Vision Problems] : vision problems [SOB on Exertion] : shortness of breath during exertion [Cough] : no cough [Abdominal Pain] : no abdominal pain [Insomnia] : no insomnia [Muscle Weakness] : no muscle weakness [FreeTextEntry2] : fatigue and weakness [Negative] : Genitourinary [FreeTextEntry3] : glasses [FreeTextEntry9] : Cane for ambulation. [FreeTextEntry7] : poor appetite [FreeTextEntry8] : Currently uses indwelling catheter 24 hours a day.

## 2019-06-10 NOTE — ASSESSMENT
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 He has undergone androgen deprivation therapy and IMRT and who appears to have a rapidly rising PSA despite the addition of bicalutamide in August of 2018. He is also had a bone scan on 12/26/18 that shows suspicious uptake in the manubrium, right anterior second rib, and at the superior lateral aspect of the left acetabulum.  These findings were confirmed on a CAT scan of the chest, abdomen, and pelvis. Also found were bilateral pulmonary nodules as well as possible right hilar lymphadenopathy. This was obscured due to lack of intravenous contrast. He had a PET/CT on 2/7/19 which unfortunately confirmed the hypermetabolic nature of multiple thoracic lymph nodes most prominent in the right hilum, hypermetabolic left retrocrural lymph node, multiple hypermetabolic pulmonary nodules, as well as multiple hypermetabolic osseous foci.  This included a T4 and L5 vertebra, right iliac bone, left iliac bone, left anterior acetabulum, sternum, and right second rib.\par Testosterone levels appear to be below the threshold for castration level.\par Given the bony metastatic disease I converted his Prolia every 6 months to Xgeva every 4 weeks.\par He was able to obtain dental clearance for this.\par He will receive Xgeva monthly.\par At this time he denies musculoskeletal pain.\par Given these above findings I had recommended that he start on Xtandi therapy. I discussed the benefits/risks/side effects with him and both with his daughter and son, they wish to proceed forward with therapy.  He started on 3/16/19.  Unfortunately his PSA levels continue to rise.  Restaging scans showed significant progression of disease including new liver metastasis, bone metastasis, and pleural based metastasis.  Given the rapid progression despite the use of Xtandi, I favored switching him to Taxotere based chemotherapy.  Given his overall poor performance status I was concerned that he would tolerate once every 3 week dosing poorly. Therefore, I have started Taxotere on a weekly basis for 3 weeks consecutively followed by one week off on 6/4/19. I have recommended a biopsy of the pleural-based masses to rule out any simultaneous different primary. He currently does not wish to do this. We also discussed the option of hospice care but he wishes to continue active therapy.\par His anemia workup has revealed an iron deficiency and he has completed intravenous iron therapy.  Iron studies will be repeated today.  If his iron stores improve and he continues to remain severely anemic then a bone marrow exam will be needed to r/o a marrow infiltrative process vs. a hypoproliferative bone marrow state as these may play a role in the treatment of his prostate cancer moving forward. \par He will return in next week for followup and labs.   Risks/benefits/side effects were discussed with the patient and his daughter and they wished to proceed.  Decadron premedications and Zofran/Compazine were called in electronically.

## 2019-06-17 ENCOUNTER — APPOINTMENT (OUTPATIENT)
Dept: HEMATOLOGY ONCOLOGY | Facility: CLINIC | Age: 79
End: 2019-06-17
Payer: MEDICARE

## 2019-06-17 VITALS
SYSTOLIC BLOOD PRESSURE: 96 MMHG | BODY MASS INDEX: 22.75 KG/M2 | HEART RATE: 100 BPM | TEMPERATURE: 97.9 F | WEIGHT: 183 LBS | RESPIRATION RATE: 18 BRPM | HEIGHT: 75 IN | OXYGEN SATURATION: 100 % | DIASTOLIC BLOOD PRESSURE: 51 MMHG

## 2019-06-17 DIAGNOSIS — R19.7 DIARRHEA, UNSPECIFIED: ICD-10-CM

## 2019-06-17 PROCEDURE — 99214 OFFICE O/P EST MOD 30 MIN: CPT

## 2019-06-17 RX ORDER — CIPROFLOXACIN HYDROCHLORIDE 250 MG/1
250 TABLET, FILM COATED ORAL DAILY
Qty: 3 | Refills: 0 | Status: ACTIVE | COMMUNITY
Start: 2019-06-17 | End: 1900-01-01

## 2019-06-18 PROBLEM — R19.7 DIARRHEA WITH DEHYDRATION: Status: ACTIVE | Noted: 2019-06-18

## 2019-06-18 NOTE — HISTORY OF PRESENT ILLNESS
[M: ___] : M[unfilled] [AJCC Stage: ____] : AJCC Stage: [unfilled] [0 - No Distress] : Distress Level: 0 [de-identified] : This is a very pleasant 79-year-old gentleman with the below past medical history whose oncological history dates back to December of 2015. He had been receiving his care in [facility] up to this point.   He underwent a prostatic biopsy for a rising PSA, he believes it was 4.1ng/ml at that time, and was found to have a [place] score 9 adenocarcinoma. He underwent a CAT scan of the abdomen and pelvis on January 12, 2016 as well as a bone scan on that same day both of which were negative for metastatic disease. He also underwent a DEXA bone density scan which revealed osteopenia. He was therefore initiated on androgen deprivation therapy in late January of 2016 and underwent IMRT.  It was recommended to him to stay on Lupron for 2-3 years thereafter. More recently it was noted that his PSA started increasing again. As an example, his PSA on 4/5/2017 was 0.24; on 8/8/2017 and was 0.34; on 4/26/2018 was 1.1; on 7/24/2018 it was 2.4 ng/ml. He believes that approximately in August of 2018 bicalutamide was added to his treatment regimen.  His course was complicated with urinary retention after the radiation therapy. He has recently moved to the local area to be closer to family and wished to transfer his care here.  He has been under the care of Dr. Waters since admission at [facility] for urinary tract infection in September of 2018.  His most recent PSA obtained by Dr. Waters was more than 28 ng/ml.  He is here for f/u and labs.  Started on Xtandi on 3/16/19.  PSA peaked at around 46 at the time Xtandi was started.  He unfortunately progress on this and was started on weekly Taxotere on 6/4/19. He is now here for office followup with labs. [de-identified] : Mr Clarke is here today for a follow up visit with his daughter Hattie. He reports extreme fatigue. Treatment otherwise has been well tolerated per pt, one episode of nausea relieved by zofran, now resolved.

## 2019-06-18 NOTE — REVIEW OF SYSTEMS
[Fatigue] : fatigue [Vision Problems] : vision problems [SOB on Exertion] : shortness of breath during exertion [Negative] : Heme/Lymph [Cough] : no cough [Diarrhea] : diarrhea [Dizziness] : dizziness [Insomnia] : no insomnia [Muscle Weakness] : no muscle weakness [FreeTextEntry2] : fatigue and weakness [FreeTextEntry3] : glasses [FreeTextEntry7] : multiple episodes only over the last 24 hours. [FreeTextEntry9] : Cane for ambulation. [FreeTextEntry8] : Currently uses indwelling catheter 24 hours a day.  [de-identified] : occ.

## 2019-06-18 NOTE — ASSESSMENT
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 He has undergone androgen deprivation therapy and IMRT and who appears to have a rapidly rising PSA despite the addition of bicalutamide in August of 2018. He is also had a bone scan on 12/26/18 that shows suspicious uptake in the manubrium, right anterior second rib, and at the superior lateral aspect of the left acetabulum.  These findings were confirmed on a CAT scan of the chest, abdomen, and pelvis. Also found were bilateral pulmonary nodules as well as possible right hilar lymphadenopathy. This was obscured due to lack of intravenous contrast. He had a PET/CT on 2/7/19 which unfortunately confirmed the hypermetabolic nature of multiple thoracic lymph nodes most prominent in the right hilum, hypermetabolic left retrocrural lymph node, multiple hypermetabolic pulmonary nodules, as well as multiple hypermetabolic osseous foci.  This included a T4 and L5 vertebra, right iliac bone, left iliac bone, left anterior acetabulum, sternum, and right second rib.\par Testosterone levels appear to be below the threshold for castration level.\par Given the bony metastatic disease I converted his Prolia every 6 months to Xgeva every 4 weeks.\par He was able to obtain dental clearance for this.\par He will receive Xgeva monthly.\par At this time he denies musculoskeletal pain.\par Given these above findings I had recommended that he start on Xtandi therapy. I discussed the benefits/risks/side effects with him and both with his daughter and son, they wish to proceed forward with therapy.  He started on 3/16/19.  Unfortunately his PSA levels continue to rise.  Restaging scans showed significant progression of disease including new liver metastasis, bone metastasis, and pleural based metastasis.  Given the rapid progression despite the use of Xtandi, I favored switching him to Taxotere based chemotherapy.  Given his overall poor performance status I was concerned that he would tolerate once every 3 week dosing poorly. Therefore, I have started Taxotere on a weekly basis for 3 weeks consecutively followed by one week off on 6/4/19. I have recommended a biopsy of the pleural-based masses to rule out any simultaneous different primary. He currently does not wish to do this. We also discussed the option of hospice care but he wishes to continue active therapy.\par His anemia workup has revealed an iron deficiency and he has completed intravenous iron therapy. \par He will return tomorrow for his scheduled cycle #1 week #3 dose of Taxotere. He will be assessed at that time and if he is now off we will proceed with third week of treatment. He was also started on the use of Imodium for his diarrhea when necessary and should that not be sufficient to us and let us know and Lomotil can be prescribed.\par He will return in next week for followup and labs.

## 2019-06-18 NOTE — PHYSICAL EXAM
[Ambulatory and capable of all self care but unable to carry out any work activities] : Status 2- Ambulatory and capable of all self care but unable to carry out any work activities. Up and about more than 50% of waking hours [Cachectic] : cachectic [Normal] : affect appropriate [de-identified] : chronically ill appearing. [de-identified] : slightly tachycardic.

## 2019-06-25 ENCOUNTER — APPOINTMENT (OUTPATIENT)
Dept: HEMATOLOGY ONCOLOGY | Facility: CLINIC | Age: 79
End: 2019-06-25
Payer: MEDICARE

## 2019-06-25 VITALS
HEART RATE: 94 BPM | RESPIRATION RATE: 18 BRPM | DIASTOLIC BLOOD PRESSURE: 60 MMHG | HEIGHT: 75 IN | WEIGHT: 181 LBS | OXYGEN SATURATION: 98 % | BODY MASS INDEX: 22.5 KG/M2 | SYSTOLIC BLOOD PRESSURE: 112 MMHG | TEMPERATURE: 98.1 F

## 2019-06-25 PROCEDURE — 99214 OFFICE O/P EST MOD 30 MIN: CPT

## 2019-06-25 NOTE — HISTORY OF PRESENT ILLNESS
[de-identified] : This is a very pleasant 79-year-old gentleman with the below past medical history whose oncological history dates back to December of 2015. He had been receiving his care in [facility] up to this point.   He underwent a prostatic biopsy for a rising PSA, he believes it was 4.1ng/ml at that time, and was found to have a [place] score 9 adenocarcinoma. He underwent a CAT scan of the abdomen and pelvis on January 12, 2016 as well as a bone scan on that same day both of which were negative for metastatic disease. He also underwent a DEXA bone density scan which revealed osteopenia. He was therefore initiated on androgen deprivation therapy in late January of 2016 and underwent IMRT.  It was recommended to him to stay on Lupron for 2-3 years thereafter. More recently it was noted that his PSA started increasing again. As an example, his PSA on 4/5/2017 was 0.24; on 8/8/2017 and was 0.34; on 4/26/2018 was 1.1; on 7/24/2018 it was 2.4 ng/ml. He believes that approximately in August of 2018 bicalutamide was added to his treatment regimen.  His course was complicated with urinary retention after the radiation therapy. He has recently moved to the local area to be closer to family and wished to transfer his care here.  He has been under the care of Dr. Waters since admission at [facility] for urinary tract infection in September of 2018.  His most recent PSA obtained by Dr. Waters was more than 28 ng/ml.  He is here for f/u and labs.  Started on Xtandi on 3/16/19.  PSA peaked at around 46 at the time Xtandi was started.  He unfortunately progress on this and was started on weekly Taxotere on 6/4/19. He is now here for office followup with labs. [de-identified] : Mr Clarke is here today for a follow up visit with his daughter Hattie. He reports extreme fatigue. Complaints of diarrhea Sat and today relieved by imodium.

## 2019-06-25 NOTE — ASSESSMENT
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 He has undergone androgen deprivation therapy and IMRT and who appears to have a rapidly rising PSA despite the addition of bicalutamide in August of 2018. He is also had a bone scan on 12/26/18 that shows suspicious uptake in the manubrium, right anterior second rib, and at the superior lateral aspect of the left acetabulum.  These findings were confirmed on a CAT scan of the chest, abdomen, and pelvis. Also found were bilateral pulmonary nodules as well as possible right hilar lymphadenopathy. This was obscured due to lack of intravenous contrast. He had a PET/CT on 2/7/19 which unfortunately confirmed the hypermetabolic nature of multiple thoracic lymph nodes most prominent in the right hilum, hypermetabolic left retrocrural lymph node, multiple hypermetabolic pulmonary nodules, as well as multiple hypermetabolic osseous foci.  This included a T4 and L5 vertebra, right iliac bone, left iliac bone, left anterior acetabulum, sternum, and right second rib.\par Testosterone levels appear to be below the threshold for castration level.\par Given the bony metastatic disease I converted his Prolia every 6 months to Xgeva every 4 weeks.\par He was able to obtain dental clearance for this.\par He will receive Xgeva monthly.\par At this time he denies musculoskeletal pain.\par Given these above findings I had recommended that he start on Xtandi therapy. I discussed the benefits/risks/side effects with him and both with his daughter and son, they wish to proceed forward with therapy.  He started on 3/16/19.  Unfortunately his PSA levels continue to rise.  Restaging scans showed significant progression of disease including new liver metastasis, bone metastasis, and pleural based metastasis.  Given the rapid progression despite the use of Xtandi, I favored switching him to Taxotere based chemotherapy.  Given his overall poor performance status I was concerned that he would tolerate once every 3 week dosing poorly. Therefore, I have started Taxotere on a weekly basis for 3 weeks consecutively followed by one week off on 6/4/19. I have recommended a biopsy of the pleural-based masses to rule out any simultaneous different primary. He currently does not wish to do this. We also discussed the option of hospice care but he wishes to continue active therapy.\par His anemia workup has revealed an iron deficiency and he has completed intravenous iron therapy. \par He is on cycle #1 week #4 of Taxotere.   A PSA level is being rechecked today.   He was also started on the use of Imodium for his diarrhea when necessary and should that not be sufficient to us and let us know and Lomotil can be prescribed.\par He will return in next week for followup and labs.

## 2019-06-25 NOTE — REVIEW OF SYSTEMS
[Cough] : no cough [Vision Problems] : no vision problems [Insomnia] : no insomnia [Negative] : Gastrointestinal [Muscle Weakness] : no muscle weakness [FreeTextEntry3] : glasses [FreeTextEntry2] : fatigue and weakness [FreeTextEntry7] : Saturday and today, relieved by imodium. [FreeTextEntry8] : Currently uses indwelling catheter 24 hours a day.  [de-identified] : occ. [FreeTextEntry9] : Cane for ambulation.

## 2019-06-28 NOTE — ASSESSMENT
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 He has undergone androgen deprivation therapy and IMRT and who appears to have a rapidly rising PSA despite the addition of bicalutamide in August of 2018. He is also had a bone scan on 12/26/18 that shows suspicious uptake in the manubrium, right anterior second rib, and at the superior lateral aspect of the left acetabulum.  These findings were confirmed on his recent CAT scan of the chest, abdomen, and pelvis. Also found were bilateral pulmonary nodules as well as possible right hilar lymphadenopathy. This was obscured due to lack of intravenous contrast. He had a PET/CT on 2/7/19 which unfortunately confirmed the hypermetabolic nature of multiple thoracic lymph nodes most prominent in the right hilum, hypermetabolic left retrocrural lymph node, multiple hypermetabolic pulmonary nodules, as well as multiple hypermetabolic osseous foci.  This included a T4 and L5 vertebra, right iliac bone, left iliac bone, left anterior acetabulum, sternum, and right second rib.\par Testosterone levels appear to be below the threshold for castration level.\par Given the bony metastatic disease I converted his Prolia every 6 months to Xgeva every 4 weeks.\par He was able to obtain dental clearance for this.\par He will receive Xgeva monthly.\par At this time he denies musculoskeletal pain.\par Given these above findings I had recommended that he start on Xtandi therapy. I discussed the benefits/risks/side effects with him and both with his daughter and son, they wish to proceed forward with therapy.  He started on 3/16/19.  Unfortunately his PSA levels continue to rise.  Restaging scans showed significant progression of disease including new liver metastasis, bone metastasis, and pleural based metastasis.  Given the rapid progression despite the use of Xtandi, I favor switching him to Taxotere based chemotherapy.  Given his overall poor performance status I am concerned that he would tolerate once every 3 week dosing poorly. Therefore, I am recommending that he start Taxotere on a weekly basis for 3 weeks consecutively followed by one week off. I have recommended a biopsy of the pleural-based masses to rule out any simultaneous different primary. He currently does not wish to do this then I would like to start his chemotherapy as soon as possible. We also discussed the option of hospice care but he wishes to continue active therapy.\par His anemia workup has revealed an iron deficiency and he has completed intravenous iron therapy.  Iron studies will be repeated today.  If his iron stores improve and he continues to remain severely anemic then a bone marrow exam will be needed to r/o a marrow infiltrative process vs. a hypoproliferative bone marrow state as these may play a role in the treatment of his prostate cancer moving forward. \par He will return in next week for followup and to start treatment.  Risks/benefits/side effects were discussed with the patient and his daughter and they wish to proceed.  Decadron premedications and Zofran/Compazine were called in electronically.

## 2019-06-28 NOTE — REVIEW OF SYSTEMS
[Fatigue] : fatigue [Vision Problems] : vision problems [SOB on Exertion] : shortness of breath during exertion [Negative] : Gastrointestinal [Cough] : no cough [Joint Pain] : no joint pain [Abdominal Pain] : no abdominal pain [FreeTextEntry2] : fatigue. [Insomnia] : no insomnia [FreeTextEntry3] : glasses [FreeTextEntry8] : Currently uses indwelling catheter 24 hours a day.  [FreeTextEntry7] : poor appetite

## 2019-06-28 NOTE — HISTORY OF PRESENT ILLNESS
[M: ___] : M[unfilled] [AJCC Stage: ____] : AJCC Stage: [unfilled] [0 - No Distress] : Distress Level: 0 [de-identified] : Mr Clarke is here today for a follow up visit with his daughter Hattie. He reports fatigue. [de-identified] : This is a very pleasant 79-year-old gentleman with the below past medical history whose oncological history dates back to December of 2015. He had been receiving his care in [facility] up to this point.   He underwent a prostatic biopsy for a rising PSA, he believes it was 4.1ng/ml at that time, and was found to have a [place] score 9 adenocarcinoma. He underwent a CAT scan of the abdomen and pelvis on January 12, 2016 as well as a bone scan on that same day both of which were negative for metastatic disease. He also underwent a DEXA bone density scan which revealed osteopenia. He was therefore initiated on androgen deprivation therapy in late January of 2016 and underwent IMRT.  It was recommended to him to stay on Lupron for 2-3 years thereafter. More recently it was noted that his PSA started increasing again. As an example, his PSA on 4/5/2017 was 0.24; on 8/8/2017 and was 0.34; on 4/26/2018 was 1.1; on 7/24/2018 it was 2.4 ng/ml. He believes that approximately in August of 2018 bicalutamide was added to his treatment regimen.  His course was complicated with urinary retention after the radiation therapy. He has recently moved to the local area to be closer to family and wished to transfer his care here.  He has been under the care of Dr. Waters since admission at [facility] [place] for urinary tract infection in September of 2018.  His most recent PSA obtained by Dr. Waters was more than 28 ng/ml.  He is here for f/u and labs.  Started on Xtandi on 3/16/19.  PSA peaked at around 46 at the time Xtandi was started.

## 2019-07-01 ENCOUNTER — APPOINTMENT (OUTPATIENT)
Dept: HEMATOLOGY ONCOLOGY | Facility: CLINIC | Age: 79
End: 2019-07-01
Payer: MEDICARE

## 2019-07-01 VITALS
DIASTOLIC BLOOD PRESSURE: 68 MMHG | BODY MASS INDEX: 22.5 KG/M2 | HEART RATE: 93 BPM | OXYGEN SATURATION: 99 % | WEIGHT: 181 LBS | TEMPERATURE: 97.5 F | RESPIRATION RATE: 18 BRPM | SYSTOLIC BLOOD PRESSURE: 110 MMHG | HEIGHT: 75 IN

## 2019-07-01 PROCEDURE — 99214 OFFICE O/P EST MOD 30 MIN: CPT

## 2019-07-01 NOTE — HISTORY OF PRESENT ILLNESS
[M: ___] : M[unfilled] [AJCC Stage: ____] : AJCC Stage: [unfilled] [0 - No Distress] : Distress Level: 0 [de-identified] : This is a very pleasant 79-year-old gentleman with the below past medical history whose oncological history dates back to December of 2015. He had been receiving his care in [facility] up to this point.   He underwent a prostatic biopsy for a rising PSA, he believes it was 4.1ng/ml at that time, and was found to have a [place] score 9 adenocarcinoma. He underwent a CAT scan of the abdomen and pelvis on January 12, 2016 as well as a bone scan on that same day both of which were negative for metastatic disease. He also underwent a DEXA bone density scan which revealed osteopenia. He was therefore initiated on androgen deprivation therapy in late January of 2016 and underwent IMRT.  It was recommended to him to stay on Lupron for 2-3 years thereafter. More recently it was noted that his PSA started increasing again. As an example, his PSA on 4/5/2017 was 0.24; on 8/8/2017 and was 0.34; on 4/26/2018 was 1.1; on 7/24/2018 it was 2.4 ng/ml. He believes that approximately in August of 2018 bicalutamide was added to his treatment regimen.  His course was complicated with urinary retention after the radiation therapy. He has recently moved to the local area to be closer to family and wished to transfer his care here.  He has been under the care of Dr. Waters since admission at [facility] for urinary tract infection in September of 2018.  His most recent PSA obtained by Dr. Waters was more than 28 ng/ml.  He is here for f/u and labs.  Started on Xtandi on 3/16/19.  PSA peaked at around 46 at the time Xtandi was started.  He unfortunately progress on this and was started on weekly Taxotere on 6/4/19. He is now here for office followup with labs. [de-identified] : Mr Clarke is here today for a follow up visit. Fatigue improved, reports being more energetic.

## 2019-07-01 NOTE — PHYSICAL EXAM
[Ambulatory and capable of all self care but unable to carry out any work activities] : Status 2- Ambulatory and capable of all self care but unable to carry out any work activities. Up and about more than 50% of waking hours [Cachectic] : cachectic [Normal] : affect appropriate [de-identified] : chronically ill appearing. [de-identified] : slightly tachycardic.

## 2019-07-01 NOTE — REVIEW OF SYSTEMS
[Fatigue] : fatigue [Diarrhea] : diarrhea [SOB on Exertion] : shortness of breath during exertion [Negative] : Constitutional [Vision Problems] : no vision problems [Cough] : no cough [Dizziness] : no dizziness [Insomnia] : no insomnia [Muscle Weakness] : no muscle weakness [FreeTextEntry2] : fatigue and weakness improved [FreeTextEntry3] : glasses [FreeTextEntry6] : mild improvement [FreeTextEntry8] : Currently uses indwelling catheter 24 hours a day.  [FreeTextEntry9] : Cane for ambulation. [de-identified] : resolved

## 2019-07-01 NOTE — ASSESSMENT
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 He has undergone androgen deprivation therapy and IMRT and who appears to have a rapidly rising PSA despite the addition of bicalutamide in August of 2018. He is also had a bone scan on 12/26/18 that shows suspicious uptake in the manubrium, right anterior second rib, and at the superior lateral aspect of the left acetabulum.  These findings were confirmed on a CAT scan of the chest, abdomen, and pelvis. Also found were bilateral pulmonary nodules as well as possible right hilar lymphadenopathy. This was obscured due to lack of intravenous contrast. He had a PET/CT on 2/7/19 which unfortunately confirmed the hypermetabolic nature of multiple thoracic lymph nodes most prominent in the right hilum, hypermetabolic left retrocrural lymph node, multiple hypermetabolic pulmonary nodules, as well as multiple hypermetabolic osseous foci.  This included a T4 and L5 vertebra, right iliac bone, left iliac bone, left anterior acetabulum, sternum, and right second rib.\par Testosterone levels appear to be below the threshold for castration level.\par Given the bony metastatic disease I converted his Prolia every 6 months to Xgeva every 4 weeks.\par He was able to obtain dental clearance for this.\par He will receive Xgeva monthly.\par At this time he denies musculoskeletal pain.\par Given these above findings I had recommended that he start on Xtandi therapy. I discussed the benefits/risks/side effects with him and both with his daughter and son, they wish to proceed forward with therapy.  He started on 3/16/19.  Unfortunately his PSA levels continue to rise.  Restaging scans showed significant progression of disease including new liver metastasis, bone metastasis, and pleural based metastasis.  Given the rapid progression despite the use of Xtandi, I favored switching him to Taxotere based chemotherapy.  Given his overall poor performance status I was concerned that he would tolerate once every 3 week dosing poorly. Therefore, I have started Taxotere on a weekly basis for 3 weeks consecutively followed by one week off on 6/4/19. I have recommended a biopsy of the pleural-based masses to rule out any simultaneous different primary. He currently does not wish to do this. We also discussed the option of hospice care but he wishes to continue active therapy.\par His anemia workup has revealed an iron deficiency and he has completed intravenous iron therapy. \par He is on cycle #1 week #4 of Taxotere.   A PSA level has decreased after C#1 and he feels better overall now.   He was also started on the use of Imodium for his diarrhea when necessary and should that not be sufficient to us and let us know and Lomotil can be prescribed.  C#2 to start tomorrow.\par He will return in next week for followup and labs.

## 2019-07-08 ENCOUNTER — APPOINTMENT (OUTPATIENT)
Dept: HEMATOLOGY ONCOLOGY | Facility: CLINIC | Age: 79
End: 2019-07-08
Payer: MEDICARE

## 2019-07-08 VITALS
WEIGHT: 183 LBS | OXYGEN SATURATION: 97 % | DIASTOLIC BLOOD PRESSURE: 61 MMHG | BODY MASS INDEX: 22.75 KG/M2 | SYSTOLIC BLOOD PRESSURE: 98 MMHG | HEIGHT: 75 IN | TEMPERATURE: 97.5 F | HEART RATE: 92 BPM | RESPIRATION RATE: 18 BRPM

## 2019-07-08 PROCEDURE — 99214 OFFICE O/P EST MOD 30 MIN: CPT

## 2019-07-08 NOTE — REVIEW OF SYSTEMS
[SOB on Exertion] : shortness of breath during exertion [Negative] : Heme/Lymph [Fatigue] : fatigue [Vision Problems] : no vision problems [Cough] : no cough [Muscle Weakness] : no muscle weakness [FreeTextEntry6] : mild improvement [FreeTextEntry2] : fatigue and weakness persist. [FreeTextEntry8] : Currently uses indwelling catheter 24 hours a day.  [FreeTextEntry3] : glasses [FreeTextEntry9] : Cane for ambulation.

## 2019-07-08 NOTE — PHYSICAL EXAM
[Ambulatory and capable of all self care but unable to carry out any work activities] : Status 2- Ambulatory and capable of all self care but unable to carry out any work activities. Up and about more than 50% of waking hours [Cachectic] : cachectic [Normal] : grossly intact [de-identified] : chronically ill appearing. [de-identified] : slightly tachycardic.

## 2019-07-08 NOTE — ASSESSMENT
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 He has undergone androgen deprivation therapy and IMRT and who appears to have a rapidly rising PSA despite the addition of bicalutamide in August of 2018. He is also had a bone scan on 12/26/18 that shows suspicious uptake in the manubrium, right anterior second rib, and at the superior lateral aspect of the left acetabulum.  These findings were confirmed on a CAT scan of the chest, abdomen, and pelvis. Also found were bilateral pulmonary nodules as well as possible right hilar lymphadenopathy. This was obscured due to lack of intravenous contrast. He had a PET/CT on 2/7/19 which unfortunately confirmed the hypermetabolic nature of multiple thoracic lymph nodes most prominent in the right hilum, hypermetabolic left retrocrural lymph node, multiple hypermetabolic pulmonary nodules, as well as multiple hypermetabolic osseous foci.  This included a T4 and L5 vertebra, right iliac bone, left iliac bone, left anterior acetabulum, sternum, and right second rib.\par Testosterone levels appear to be below the threshold for castration level.\par Given the bony metastatic disease I converted his Prolia every 6 months to Xgeva every 4 weeks.\par He was able to obtain dental clearance for this.\par He will receive Xgeva monthly.\par At this time he denies musculoskeletal pain.\par Given these above findings I had recommended that he start on Xtandi therapy. I discussed the benefits/risks/side effects with him and both with his daughter and son, they wish to proceed forward with therapy.  He started on 3/16/19.  Unfortunately his PSA levels continue to rise.  Restaging scans showed significant progression of disease including new liver metastasis, bone metastasis, and pleural based metastasis.  Given the rapid progression despite the use of Xtandi, I favored switching him to Taxotere based chemotherapy.  Given his overall poor performance status I was concerned that he would tolerate once every 3 week dosing poorly. Therefore, I have started Taxotere on a weekly basis for 3 weeks consecutively followed by one week off on 6/4/19. I have recommended a biopsy of the pleural-based masses to rule out any simultaneous different primary. He currently does not wish to do this. We also discussed the option of hospice care but he wishes to continue active therapy.\par His anemia workup has revealed an iron deficiency and he has completed intravenous iron therapy. \par He is on cycle #2 week #2 of Taxotere.   A PSA level has decreased after C#1 and he feels better overall now.   He was also started on the use of Imodium for his diarrhea when necessary and should that not be sufficient to us and let us know and Lomotil can be prescribed.  C#2 W#2 to start tomorrow.\par He will receive IVF today for his symptomatic volume depletion.\par He will return in next week for followup and labs.

## 2019-07-15 ENCOUNTER — OTHER (OUTPATIENT)
Age: 79
End: 2019-07-15

## 2019-07-17 ENCOUNTER — OTHER (OUTPATIENT)
Age: 79
End: 2019-07-17

## 2019-07-22 ENCOUNTER — APPOINTMENT (OUTPATIENT)
Dept: HEMATOLOGY ONCOLOGY | Facility: CLINIC | Age: 79
End: 2019-07-22
Payer: MEDICARE

## 2019-07-22 VITALS
WEIGHT: 187 LBS | TEMPERATURE: 97.5 F | BODY MASS INDEX: 23.25 KG/M2 | HEART RATE: 94 BPM | HEIGHT: 75 IN | DIASTOLIC BLOOD PRESSURE: 65 MMHG | RESPIRATION RATE: 18 BRPM | SYSTOLIC BLOOD PRESSURE: 121 MMHG | OXYGEN SATURATION: 99 %

## 2019-07-22 PROCEDURE — 99214 OFFICE O/P EST MOD 30 MIN: CPT

## 2019-07-23 NOTE — REVIEW OF SYSTEMS
[Vision Problems] : no vision problems [FreeTextEntry3] : glasses [FreeTextEntry2] : improved.  [Muscle Weakness] : no muscle weakness [FreeTextEntry7] : improved appetite. [FreeTextEntry8] : Currently uses indwelling catheter 24 hours a day.  [FreeTextEntry6] : improved [FreeTextEntry9] : Cane for ambulation.

## 2019-07-23 NOTE — ASSESSMENT
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 He has undergone androgen deprivation therapy and IMRT and who appears to have a rapidly rising PSA despite the addition of bicalutamide in August of 2018. He is also had a bone scan on 12/26/18 that shows suspicious uptake in the manubrium, right anterior second rib, and at the superior lateral aspect of the left acetabulum.  These findings were confirmed on a CAT scan of the chest, abdomen, and pelvis. Also found were bilateral pulmonary nodules as well as possible right hilar lymphadenopathy. This was obscured due to lack of intravenous contrast. He had a PET/CT on 2/7/19 which unfortunately confirmed the hypermetabolic nature of multiple thoracic lymph nodes most prominent in the right hilum, hypermetabolic left retrocrural lymph node, multiple hypermetabolic pulmonary nodules, as well as multiple hypermetabolic osseous foci.  This included a T4 and L5 vertebra, right iliac bone, left iliac bone, left anterior acetabulum, sternum, and right second rib.\par Testosterone levels appear to be below the threshold for castration level.\par Given the bony metastatic disease I converted his Prolia every 6 months to Xgeva every 4 weeks.\par He was able to obtain dental clearance for this.\par He will receive Xgeva monthly.\par At this time he denies musculoskeletal pain.\par Given these above findings I had recommended that he start on Xtandi therapy. I discussed the benefits/risks/side effects with him and both with his daughter and son, they wish to proceed forward with therapy.  He started on 3/16/19.  Unfortunately his PSA levels continue to rise.  Restaging scans showed significant progression of disease including new liver metastasis, bone metastasis, and pleural based metastasis.  Given the rapid progression despite the use of Xtandi, I favored switching him to Taxotere based chemotherapy.  Given his overall poor performance status I was concerned that he would tolerate once every 3 week dosing poorly. Therefore, I have started Taxotere on a weekly basis for 3 weeks consecutively followed by one week off on 6/4/19. I have recommended a biopsy of the pleural-based masses to rule out any simultaneous different primary. He currently does not wish to do this. We also discussed the option of hospice care but he wishes to continue active therapy.\par His anemia workup has revealed an iron deficiency and he has completed intravenous iron therapy. \par He is on cycle #2 week #4 of Taxotere.   A PSA level has decreased after C#1 and he feels better overall now.   He was also started on the use of Imodium for his diarrhea when necessary and should that not be sufficient to us and let us know and Lomotil can be prescribed.  C#3 to start next week.\par He will receive IVF today for his symptomatic volume depletion.\par He will return next week for followup and labs.

## 2019-07-23 NOTE — HISTORY OF PRESENT ILLNESS
[de-identified] : Mr Clarke is here today for a follow up visit in the company of his daughter, Hattie.  Improved fatigue. Tolerating treatment well. Due next week for treatment.  [de-identified] : This is a very pleasant 79-year-old gentleman with the below past medical history whose oncological history dates back to December of 2015. He had been receiving his care in [facility] up to this point.   He underwent a prostatic biopsy for a rising PSA, he believes it was 4.1ng/ml at that time, and was found to have a [place] score 9 adenocarcinoma. He underwent a CAT scan of the abdomen and pelvis on January 12, 2016 as well as a bone scan on that same day both of which were negative for metastatic disease. He also underwent a DEXA bone density scan which revealed osteopenia. He was therefore initiated on androgen deprivation therapy in late January of 2016 and underwent IMRT.  It was recommended to him to stay on Lupron for 2-3 years thereafter. More recently it was noted that his PSA started increasing again. As an example, his PSA on 4/5/2017 was 0.24; on 8/8/2017 and was 0.34; on 4/26/2018 was 1.1; on 7/24/2018 it was 2.4 ng/ml. He believes that approximately in August of 2018 bicalutamide was added to his treatment regimen.  His course was complicated with urinary retention after the radiation therapy. He has recently moved to the local area to be closer to family and wished to transfer his care here.  He has been under the care of Dr. Waters since admission at [facility] for urinary tract infection in September of 2018.  His most recent PSA obtained by Dr. Waters was more than 28 ng/ml.  He is here for f/u and labs.  Started on Xtandi on 3/16/19.  PSA peaked at around 46 at the time Xtandi was started.  He unfortunately progress on this and was started on weekly Taxotere on 6/4/19. He is now here for office followup with labs.

## 2019-07-29 ENCOUNTER — APPOINTMENT (OUTPATIENT)
Dept: HEMATOLOGY ONCOLOGY | Facility: CLINIC | Age: 79
End: 2019-07-29
Payer: MEDICARE

## 2019-07-29 VITALS
SYSTOLIC BLOOD PRESSURE: 99 MMHG | RESPIRATION RATE: 18 BRPM | BODY MASS INDEX: 11.78 KG/M2 | WEIGHT: 87 LBS | TEMPERATURE: 97.3 F | OXYGEN SATURATION: 99 % | HEIGHT: 72 IN | HEART RATE: 115 BPM | DIASTOLIC BLOOD PRESSURE: 47 MMHG

## 2019-07-29 DIAGNOSIS — C61 MALIGNANT NEOPLASM OF PROSTATE: ICD-10-CM

## 2019-07-29 DIAGNOSIS — D50.9 IRON DEFICIENCY ANEMIA, UNSPECIFIED: ICD-10-CM

## 2019-07-29 DIAGNOSIS — I95.89 OTHER HYPOTENSION: ICD-10-CM

## 2019-07-29 DIAGNOSIS — E86.1 OTHER HYPOTENSION: ICD-10-CM

## 2019-07-29 DIAGNOSIS — K92.2 GASTROINTESTINAL HEMORRHAGE, UNSPECIFIED: ICD-10-CM

## 2019-07-29 DIAGNOSIS — D64.9 ANEMIA, UNSPECIFIED: ICD-10-CM

## 2019-07-29 DIAGNOSIS — C79.51 SECONDARY MALIGNANT NEOPLASM OF BONE: ICD-10-CM

## 2019-07-29 PROCEDURE — 99215 OFFICE O/P EST HI 40 MIN: CPT

## 2019-07-29 NOTE — PHYSICAL EXAM
[Ambulatory and capable of all self care but unable to carry out any work activities] : Status 2- Ambulatory and capable of all self care but unable to carry out any work activities. Up and about more than 50% of waking hours [Cachectic] : cachectic [Normal] : affect appropriate [Thrush] : thrush [de-identified] : chronically and acutely ill appearing. [de-identified] : Conjunctival pallor. [de-identified] : slightly tachycardic.

## 2019-07-29 NOTE — REVIEW OF SYSTEMS
[Fatigue] : fatigue [SOB on Exertion] : shortness of breath during exertion [Chest Pain] : chest pain [Muscle Weakness] : muscle weakness [Negative] : Genitourinary [Vision Problems] : no vision problems [FreeTextEntry2] : worsened [FreeTextEntry3] : glasses [FreeTextEntry5] : 2 episodes of chest pressure lasting for a bout one half hour each now resolved. [FreeTextEntry7] : Reports 2 episodes of melanotic appearing stools over the weekend. [FreeTextEntry8] : Currently uses indwelling catheter 24 hours a day.  [FreeTextEntry9] : wheelchair today due to extreme fatigue and weakness [de-identified] : tremors

## 2019-07-29 NOTE — HISTORY OF PRESENT ILLNESS
[M: ___] : M[unfilled] [AJCC Stage: ____] : AJCC Stage: [unfilled] [0 - No Distress] : Distress Level: 0 [de-identified] : This is a very pleasant 79-year-old gentleman with the below past medical history whose oncological history dates back to December of 2015. He had been receiving his care in [facility] up to this point.   He underwent a prostatic biopsy for a rising PSA, he believes it was 4.1ng/ml at that time, and was found to have a [place] score 9 adenocarcinoma. He underwent a CAT scan of the abdomen and pelvis on January 12, 2016 as well as a bone scan on that same day both of which were negative for metastatic disease. He also underwent a DEXA bone density scan which revealed osteopenia. He was therefore initiated on androgen deprivation therapy in late January of 2016 and underwent IMRT.  It was recommended to him to stay on Lupron for 2-3 years thereafter. More recently it was noted that his PSA started increasing again. As an example, his PSA on 4/5/2017 was 0.24; on 8/8/2017 and was 0.34; on 4/26/2018 was 1.1; on 7/24/2018 it was 2.4 ng/ml. He believes that approximately in August of 2018 bicalutamide was added to his treatment regimen.  His course was complicated with urinary retention after the radiation therapy. He has recently moved to the local area to be closer to family and wished to transfer his care here.  He has been under the care of Dr. Waters since admission at [facility] for urinary tract infection in September of 2018.  His most recent PSA obtained by Dr. Waters was more than 28 ng/ml.  He is here for f/u and labs.  Started on Xtandi on 3/16/19.  PSA peaked at around 46 at the time Xtandi was started.  He unfortunately progress on this and was started on weekly Taxotere on 6/4/19. He is now here for office followup with labs. [de-identified] : Mr Clarke is here today for a follow up visit in the company of his daughter, Hattie. Presents today with tremors, extreme fatigue, weakness and reports of 2 incidents of chest discomfort over the weekend.  He also reported to me, 2 episodes of melanotic appearing stools.

## 2019-07-29 NOTE — ASSESSMENT
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 He has undergone androgen deprivation therapy and IMRT and who appears to have a rapidly rising PSA despite the addition of bicalutamide in August of 2018. He is also had a bone scan on 12/26/18 that shows suspicious uptake in the manubrium, right anterior second rib, and at the superior lateral aspect of the left acetabulum.  These findings were confirmed on a CAT scan of the chest, abdomen, and pelvis. Also found were bilateral pulmonary nodules as well as possible right hilar lymphadenopathy. This was obscured due to lack of intravenous contrast. He had a PET/CT on 2/7/19 which unfortunately confirmed the hypermetabolic nature of multiple thoracic lymph nodes most prominent in the right hilum, hypermetabolic left retrocrural lymph node, multiple hypermetabolic pulmonary nodules, as well as multiple hypermetabolic osseous foci.  This included a T4 and L5 vertebra, right iliac bone, left iliac bone, left anterior acetabulum, sternum, and right second rib.\par Testosterone levels appear to be below the threshold for castration level.\par Given the bony metastatic disease I converted his Prolia every 6 months to Xgeva every 4 weeks.\par He was able to obtain dental clearance for this.\par He will receive Xgeva monthly.\par At this time he denies musculoskeletal pain.\par Given these above findings I had recommended that he start on Xtandi therapy. I discussed the benefits/risks/side effects with him and both with his daughter and son, they wish to proceed forward with therapy.  He started on 3/16/19.  Unfortunately his PSA levels continue to rise.  Restaging scans showed significant progression of disease including new liver metastasis, bone metastasis, and pleural based metastasis.  Given the rapid progression despite the use of Xtandi, I favored switching him to Taxotere based chemotherapy.  Given his overall poor performance status I was concerned that he would tolerate once every 3 week dosing poorly. Therefore, I have started Taxotere on a weekly basis for 3 weeks consecutively followed by one week off on 6/4/19. I have recommended a biopsy of the pleural-based masses to rule out any simultaneous different primary. He currently does not wish to do this. We also discussed the option of hospice care but he wishes to continue active therapy.\par His anemia workup has revealed an iron deficiency and he has completed intravenous iron therapy. \par He is on cycle #2 week #4 of Taxotere.   A PSA level has decreased after C#1 and he feels better overall now.   He was also started on the use of Imodium for his diarrhea when necessary and should that not be sufficient to us and let us know and Lomotil can be prescribed.  C#3 is due this week.  I will however hold at as it appears that he is numbness of an acute upper gastrointestinal bleeding episode.  His hemoglobin has dropped by nearly 4 g from the visit last week. He also reports melanotic bowel movements and recurrent chest pressure. I have therefore referred him immediately to the emergency room and have discussed his case with ER attending..\par He will return next week for followup and labs.

## 2019-08-05 ENCOUNTER — APPOINTMENT (OUTPATIENT)
Dept: HEMATOLOGY ONCOLOGY | Facility: CLINIC | Age: 79
End: 2019-08-05

## 2019-08-15 ENCOUNTER — APPOINTMENT (OUTPATIENT)
Dept: HEMATOLOGY ONCOLOGY | Facility: CLINIC | Age: 79
End: 2019-08-15

## 2019-08-19 NOTE — PHYSICAL EXAM
[de-identified] : chronically and acutely ill appearing. [de-identified] : Conjunctival pallor. [de-identified] : slightly tachycardic.

## 2019-08-19 NOTE — ASSESSMENT
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 He has undergone androgen deprivation therapy and IMRT and who appears to have a rapidly rising PSA despite the addition of bicalutamide in August of 2018. He is also had a bone scan on 12/26/18 that shows suspicious uptake in the manubrium, right anterior second rib, and at the superior lateral aspect of the left acetabulum.  These findings were confirmed on a CAT scan of the chest, abdomen, and pelvis. Also found were bilateral pulmonary nodules as well as possible right hilar lymphadenopathy. This was obscured due to lack of intravenous contrast. He had a PET/CT on 2/7/19 which unfortunately confirmed the hypermetabolic nature of multiple thoracic lymph nodes most prominent in the right hilum, hypermetabolic left retrocrural lymph node, multiple hypermetabolic pulmonary nodules, as well as multiple hypermetabolic osseous foci.  This included a T4 and L5 vertebra, right iliac bone, left iliac bone, left anterior acetabulum, sternum, and right second rib.\par Testosterone levels appear to be below the threshold for castration level.\par Given the bony metastatic disease I converted his Prolia every 6 months to Xgeva every 4 weeks.\par He was able to obtain dental clearance for this.\par He will receive Xgeva monthly.\par At this time he denies musculoskeletal pain.\par Given these above findings I had recommended that he start on Xtandi therapy. I discussed the benefits/risks/side effects with him and both with his daughter and son, they wish to proceed forward with therapy.  He started on 3/16/19.  Unfortunately his PSA levels continue to rise.  Restaging scans showed significant progression of disease including new liver metastasis, bone metastasis, and pleural based metastasis.  Given the rapid progression despite the use of Xtandi, I favored switching him to Taxotere based chemotherapy.  Given his overall poor performance status I was concerned that he would tolerate once every 3 week dosing poorly. Therefore, I have started Taxotere on a weekly basis for 3 weeks consecutively followed by one week off on 6/4/19. I have recommended a biopsy of the pleural-based masses to rule out any simultaneous different primary. He currently does not wish to do this. We also discussed the option of hospice care but he wishes to continue active therapy.\par His anemia workup has revealed an iron deficiency and he has completed intravenous iron therapy. \par He is on cycle #2 week #4 of Taxotere.   A PSA level has decreased after C#1 and he feels better overall now.   He was also started on the use of Imodium for his diarrhea when necessary and should that not be sufficient to us and let us know and Lomotil can be prescribed.  C#3 is due this week.  I will however hold at as it appears that he is numbness of an acute upper gastrointestinal bleeding episode.  His hemoglobin has dropped by nearly 4 g from the visit last week. He also reports melanotic bowel movements and recurrent chest pressure. I have therefore referred him immediately to the emergency room and have discussed his case with ER attending..\par He will return next week for followup and labs.

## 2019-08-19 NOTE — HISTORY OF PRESENT ILLNESS
[de-identified] : This is a very pleasant 79-year-old gentleman with the below past medical history whose oncological history dates back to December of 2015. He had been receiving his care in [facility] up to this point.   He underwent a prostatic biopsy for a rising PSA, he believes it was 4.1ng/ml at that time, and was found to have a [place] score 9 adenocarcinoma. He underwent a CAT scan of the abdomen and pelvis on January 12, 2016 as well as a bone scan on that same day both of which were negative for metastatic disease. He also underwent a DEXA bone density scan which revealed osteopenia. He was therefore initiated on androgen deprivation therapy in late January of 2016 and underwent IMRT.  It was recommended to him to stay on Lupron for 2-3 years thereafter. More recently it was noted that his PSA started increasing again. As an example, his PSA on 4/5/2017 was 0.24; on 8/8/2017 and was 0.34; on 4/26/2018 was 1.1; on 7/24/2018 it was 2.4 ng/ml. He believes that approximately in August of 2018 bicalutamide was added to his treatment regimen.  His course was complicated with urinary retention after the radiation therapy. He has recently moved to the local area to be closer to family and wished to transfer his care here.  He has been under the care of Dr. Waters since admission at [facility] for urinary tract infection in September of 2018.  His most recent PSA obtained by Dr. Waters was more than 28 ng/ml.  He is here for f/u and labs.  Started on Xtandi on 3/16/19.  PSA peaked at around 46 at the time Xtandi was started.  He unfortunately progress on this and was started on weekly Taxotere on 6/4/19. He is now here for office followup with labs. [de-identified] : Mr Clarke is here today for a follow up visit in the company of his daughter, Hattie. Presents today with tremors, extreme fatigue, weakness and reports of 2 incidents of chest discomfort over the weekend.  He also reported to me, 2 episodes of melanotic appearing stools.

## 2019-08-19 NOTE — REVIEW OF SYSTEMS
[FreeTextEntry2] : worsened [Vision Problems] : no vision problems [FreeTextEntry3] : glasses [FreeTextEntry5] : 2 episodes of chest pressure lasting for a bout one half hour each now resolved. [FreeTextEntry7] : Reports 2 episodes of melanotic appearing stools over the weekend. [FreeTextEntry8] : Currently uses indwelling catheter 24 hours a day.  [de-identified] : tremors [FreeTextEntry9] : wheelchair today due to extreme fatigue and weakness

## 2019-08-26 ENCOUNTER — APPOINTMENT (OUTPATIENT)
Dept: HEMATOLOGY ONCOLOGY | Facility: CLINIC | Age: 79
End: 2019-08-26

## 2020-01-01 NOTE — PHYSICAL EXAM
[Normal] : affect appropriate [Ambulatory and capable of all self care but unable to carry out any work activities] : Status 2- Ambulatory and capable of all self care but unable to carry out any work activities. Up and about more than 50% of waking hours [de-identified] : chronically ill appearing. [id number]
